# Patient Record
Sex: MALE | Race: WHITE | Employment: OTHER | URBAN - METROPOLITAN AREA
[De-identification: names, ages, dates, MRNs, and addresses within clinical notes are randomized per-mention and may not be internally consistent; named-entity substitution may affect disease eponyms.]

---

## 2019-06-05 ENCOUNTER — HOSPITAL ENCOUNTER (OUTPATIENT)
Dept: GENERAL RADIOLOGY | Age: 84
Discharge: HOME | End: 2019-06-05
Attending: NURSE PRACTITIONER
Payer: MEDICARE

## 2019-06-05 DIAGNOSIS — M25.511 RIGHT SHOULDER PAIN: ICD-10-CM

## 2019-06-05 PROCEDURE — 73030 X-RAY EXAM OF SHOULDER: CPT

## 2019-07-16 ENCOUNTER — HOSPITAL ENCOUNTER (OUTPATIENT)
Dept: GENERAL RADIOLOGY | Age: 84
Discharge: HOME | End: 2019-07-16
Attending: INTERNAL MEDICINE
Payer: MEDICARE

## 2019-07-16 DIAGNOSIS — M54.50 LUMBAGO: ICD-10-CM

## 2019-07-16 PROCEDURE — 72100 X-RAY EXAM L-S SPINE 2/3 VWS: CPT

## 2019-08-05 ENCOUNTER — OFFICE VISIT (OUTPATIENT)
Dept: NEUROLOGY | Age: 84
End: 2019-08-05

## 2019-08-05 VITALS
DIASTOLIC BLOOD PRESSURE: 80 MMHG | OXYGEN SATURATION: 95 % | BODY MASS INDEX: 34.05 KG/M2 | WEIGHT: 251.4 LBS | RESPIRATION RATE: 18 BRPM | HEIGHT: 72 IN | SYSTOLIC BLOOD PRESSURE: 138 MMHG | HEART RATE: 82 BPM

## 2019-08-05 DIAGNOSIS — G62.9 NEUROPATHY: ICD-10-CM

## 2019-08-05 DIAGNOSIS — G62.9 NEUROPATHY: Primary | ICD-10-CM

## 2019-08-05 DIAGNOSIS — R27.8 SENSORY ATAXIA: ICD-10-CM

## 2019-08-05 RX ORDER — GUAIFENESIN 100 MG/5ML
81 LIQUID (ML) ORAL DAILY
COMMUNITY

## 2019-08-05 RX ORDER — ZINC GLUCONATE 10 MG
LOZENGE ORAL
COMMUNITY

## 2019-08-05 NOTE — PATIENT INSTRUCTIONS
Information Regarding Testing If you have physican order for a test or a medication denied by your insurance company, this does not mean the test or medication is not appropriate for you as that is a medical decision, not a decision to be made by an insurance company representative or by an [facility] physician who has not interviewed and examined you. This is a decision to be made between you and your physician. The denial of services is a contractual matter between you and your insurance company, not an issue between your physician and the insurance company. If your test or medication is denied, you can take the following steps to help resolve the issue: 1. File a complaint with the [facility] regarding your insurance company's denial of services ordered for you. You can do this either by calling them directly or by completing an on-line complaint form on the [employer]. This can be found at www.[place].Rerecipe 2. Also file a formal complaint with your insurance company and ask to have the name of the person denying the service so that you may explore a legal option should you be harmed by this denial of service. Again, the fact the insurance company will not pay for the service does not mean it is not medically necessary and I would encourage you to follow through with the plan that was made with your physician 3. File a written complaint with your employer so your employer and benefit manager is aware of the poor coverage they are providing their employees. If you have medicare/medicaid, complain to your representative in the House and to your Caitie Brooks. RESULT POLICY If we have ordered testing for you, know that; \"NO NEWS IS GOOD NEWS! \" It is our policy that we no longer call patients with results, nor do we  give test results over the phone.    
 
We schedule follow up appointments so that your results can be discussed in person. This allows you to address any questions you have regarding the results. If you choose to go to an imaging center outside of [facility], it is your responsibility to bring imaging report and disc to follow up appointment. If something of concern is revealed on your test, we will contact you to discuss the matter and if needed schedule a sooner follow up appointment. Additionally, results may be found by using the My Chart feature and one of our patient service representatives at the  can give you instructions on how to access this feature to utilize our electronic medical record system. Thank you for your understanding. Learning About Peripheral Neuropathy What is peripheral neuropathy? Peripheral neuropathy is a problem that affects the peripheral nerves. These nerves lead from the spinal cord to other parts of the body. They control your sense of touch, how you feel pain and temperature, and your muscle strength. Most of the time the problem starts in the fingers and toes. As it gets worse, it moves into the limbs. It can cause pain and loss of feeling in the feet, legs, and hands. What causes it? There are several causes: · Diabetes. This is the most common cause. If your blood sugar is too high for too long, it can damage the nerves. · Kidney problems. These can lead to toxic substances in the blood that damage nerves. · Low levels of thyroid hormone (hypothyroidism). This may cause swelling of the tissues around the nerves, which can put pressure on them. · Infectious or inflammatory diseases. Examples are HIV and Guillain-Barré syndrome. These diseases can damage the nerves. · Peripheral nerve injury. A physical injury can damage the nerves. Injuries can be from things like falls, car crashes, or playing sports. · Overusing alcohol and not eating a healthy diet.  These can lead to your body not having enough of certain vitamins, such as vitamin B-12. This can damage nerves. · Being exposed to toxic substances. These include lead, mercury, and certain medicines, such as those used for chemotherapy. Sometimes the cause isn't known. What are the symptoms? Symptoms of peripheral neuropathy can occur slowly over time. The most common ones are: · Numbness, tightness, and tingling, especially in the legs, hands, and feet. · Loss of feeling. · Burning, shooting, or stabbing pain in the legs, hands, and feet. Often the pain is worse at night. · Weakness and loss of balance. What can happen if you have it? If peripheral neuropathy gets worse, it can lead to a complete lack of feeling in your hands or feet. This can make you more likely to injure them. It may lead to calluses and blisters. It can also lead to bone and joint problems, infection, and ulcers. For instance, small, repeated injuries to the foot may lead to bigger problems. This can happen because you can't feel the injuries. Reduced feeling in the feet can also change your step, leading to bone or joint problems. If untreated, foot problems can become so severe that the foot or lower leg may have to be amputated. But treatment can slow down peripheral neuropathy. And it's a good idea to take care to avoid injury. How is it diagnosed? To diagnose peripheral neuropathy, your doctor will ask you about: 
· Your symptoms. · Your medical history. This may include your use of alcohol, risk of HIV infection, or exposure to toxic substances. · Your family's medical history, including nerve disease. Your doctor may test how well you can feel touch, temperature, and pain. You may also have blood tests. These tests will help the doctor find out if you have conditions that can cause neuropathy. Examples are diabetes, vitamin deficiencies, thyroid disease, and kidney problems. How is it treated? Treatment for peripheral neuropathy can relieve symptoms. This is done by treating the health problem that's causing it. For example, if you have diabetes, keeping your blood sugar within your target range may help. Or maybe your body lacks certain vitamins caused by drinking too much alcohol. In that case, treatment may include eating a healthy diet, taking vitamins, and stopping alcohol use. You may have physical therapy. This can increase muscle strength and help build muscle control. Over-the-counter medicine can relieve mild nerve pain. Your doctor may also prescribe medicine to help with severe pain, numbness, tingling, and weakness. If you have neuropathy in your feet, it's a good idea to have them checked during each office visit. This can help prevent problems. Some people find that physical therapy, acupuncture, or transcutaneous electrical nerve stimulation (TENS) helps relieve pain. Follow-up care is a key part of your treatment and safety. Be sure to make and go to all appointments, and call your doctor if you are having problems. It's also a good idea to know your test results and keep a list of the medicines you take. Where can you learn more? Go to http://amadou-rito.info/. Enter P130 in the search box to learn more about \"Learning About Peripheral Neuropathy. \" Current as of: July 25, 2018 Content Version: 12.1 © 1351-7928 Healthwise, Incorporated. Care instructions adapted under license by [employer] (which disclaims liability or warranty for this information). If you have questions about a medical condition or this instruction, always ask your healthcare professional. [place] [address] any warranty or liability for your use of this information. Preventing Falls: Care Instructions Your Care Instructions Getting around your home safely can be a challenge if you have injuries or health problems that make it easy for you to fall. Loose rugs and furniture in walkways are among the dangers for many older people who have problems walking or who have poor eyesight. People who have conditions such as arthritis, osteoporosis, or dementia also have to be careful not to fall. You can make your home safer with a few simple measures. Follow-up care is a key part of your treatment and safety. Be sure to make and go to all appointments, and call your doctor if you are having problems. It's also a good idea to know your test results and keep a list of the medicines you take. How can you care for yourself at home? Taking care of yourself · You may get dizzy if you do not drink enough water. To prevent dehydration, drink plenty of fluids, enough so that your urine is light yellow or clear like water. Choose water and other caffeine-free clear liquids. If you have kidney, heart, or liver disease and have to limit fluids, talk with your doctor before you increase the amount of fluids you drink. · Exercise regularly to improve your strength, muscle tone, and balance. Walk if you can. Swimming may be a good choice if you cannot walk easily. · Have your vision and hearing checked each year or any time you notice a change. If you have trouble seeing and hearing, you might not be able to avoid objects and could lose your balance. · Know the side effects of the medicines you take. Ask your doctor or pharmacist whether the medicines you take can affect your balance. Sleeping pills or sedatives can affect your balance. · Limit the amount of alcohol you drink. Alcohol can impair your balance and other senses. · Ask your doctor whether calluses or corns on your feet need to be removed. If you wear loose-fitting shoes because of calluses or corns, you can lose your balance and fall. · Talk to your doctor if you have numbness in your feet. Preventing falls at home · Remove raised doorway thresholds, throw rugs, and clutter. Repair loose carpet or raised areas in the floor. · Move furniture and electrical cords to keep them out of walking paths. · Use nonskid floor wax, and wipe up spills right away, especially on ceramic tile floors. · If you use a walker or cane, put rubber tips on it. If you use crutches, clean the bottoms of them regularly with an abrasive pad, such as steel wool. · Keep your house well lit, especially Jeneal Baas, and outside walkways. Use night-lights in areas such as hallways and bathrooms. Add extra light switches or use remote switches (such as switches that go on or off when you clap your hands) to make it easier to turn lights on if you have to get up during the night. · Install sturdy handrails on stairways. · Move items in your cabinets so that the things you use a lot are on the lower shelves (about waist level). · Keep a cordless phone and a flashlight with new batteries by your bed. If possible, put a phone in each of the main rooms of your house, or carry a cell phone in case you fall and cannot reach a phone. Or, you can wear a device around your neck or wrist. You push a button that sends a signal for help. · Wear low-heeled shoes that fit well and give your feet good support. Use footwear with nonskid soles. Check the heels and soles of your shoes for wear. Repair or replace worn heels or soles. · Do not wear socks without shoes on wood floors. · Walk on the grass when the sidewalks are slippery. If you live in an area that gets snow and ice in the winter, sprinkle salt on slippery steps and sidewalks. Preventing falls in the bath · Install grab bars and nonskid mats inside and outside your shower or tub and near the toilet and sinks. · Use shower chairs and bath benches. · Use a hand-held shower head that will allow you to sit while showering.  
· Get into a tub or shower by putting the weaker leg in first. Get out of a tub or shower with your strong side first. 
· Repair loose toilet seats and consider installing a raised toilet seat to make getting on and off the toilet easier. · Keep your bathroom door unlocked while you are in the shower. Where can you learn more? Go to http://amadou-rito.info/. Enter 0476 [id number] in the search box to learn more about \"Preventing Falls: Care Instructions. \" Current as of: November 7, 2018 Content Version: 12.1 © 1015-6619 [employer]. Care instructions adapted under license by [employer] (which disclaims liability or warranty for this information). If you have questions about a medical condition or this instruction, always ask your healthcare professional. [address] any warranty or liability for your use of this information. Marizol Coronado 1729 What is a living will? A living will is a legal form you use to write down the kind of care you want at the end of your life. It is used by the health professionals who will treat you if you aren't able to decide for yourself. If you put your wishes in writing, your loved ones and others will know what kind of care you want. They won't need to guess. This can ease your mind and be helpful to others. A living will is not the same as an estate or property will. An estate will explains what you want to happen with your money and property after you die. Is a living will a legal document? A living will is a legal document. Each state has its own laws about living wong. If you move to another state, make sure that your living will is legal in the state where you now live. Or you might use a universal form that has been approved by many states. This kind of form can sometimes be completed and stored online. Your electronic copy will then be available wherever you have a connection to the Internet.  In most cases, doctors will respect your wishes even if you have a form from a different state. · You don't need an  to complete a living will. But legal advice can be helpful if your state's laws are unclear, your health history is complicated, or your family can't agree on what should be in your living will. · You can change your living will at any time. Some people find that their wishes about end-of-life care change as their health changes. · In addition to making a living will, think about completing a medical power of  form. This form lets you name the person you want to make end-of-life treatment decisions for you (your \"health care agent\") if you're not able to. Many hospitals and nursing homes will give you the forms you need to complete a living will and a medical power of . · Your living will is used only if you can't make or communicate decisions for yourself anymore. If you become able to make decisions again, you can accept or refuse any treatment, no matter what you wrote in your living will. · Your state may offer an online registry. This is a place where you can store your living will online so the doctors and nurses who need to treat you can find it right away. What should you think about when creating a living will? Talk about your end-of-life wishes with your family members and your doctor. Let them know what you want. That way the people making decisions for you won't be surprised by your choices. Think about these questions as you make your living will: · Do you know enough about life support methods that might be used? If not, talk to your doctor so you know what might be done if you can't breathe on your own, your heart stops, or you're unable to swallow. · What things would you still want to be able to do after you receive life-support methods? Would you want to be able to walk? To speak? To eat on your own? To live without the help of machines? · If you have a choice, where do you want to be cared for? In your home? At a hospital or nursing home? · Do you want certain Religion practices performed if you become very ill? · If you have a choice at the end of your life, where would you prefer to die? At home? In a hospital or nursing home? Somewhere else? · Would you prefer to be buried or cremated? · Do you want your organs to be donated after you die? What should you do with your living will? · Make sure that your family members and your health care agent have copies of your living will. · Give your doctor a copy of your living will to keep in your medical record. If you have more than one doctor, make sure that each one has a copy. · You may want to put a copy of your living will where it can be easily found. Where can you learn more? Go to http://amadou-rito.info/. Enter V533 in the search box to learn more about \"Learning About Living [place]. \" Current as of: April 1, 2019 Content Version: 12.1 © 2123-6820 Healthwise, Incorporated. Care instructions adapted under license by [employer] (which disclaims liability or warranty for this information). If you have questions about a medical condition or this instruction, always ask your healthcare professional. [address] any warranty or liability for your use of this information. Patient with readily manifest peripheral neuropathy and there is no doubt explains his imbalance issues and his fall history. We will check some labs and do an EMG nerve conduction of both lower extremity's. Further suggestions may follow.

## 2019-08-05 NOTE — LETTER
8/5/19 Patient: Abbie Rivas YOB: 1928 Date of Visit: 8/5/2019 Tata Wynne MD 
[address] [facility] [address] VIA Facsimile: [phone] Dear Tata Wynne MD, Thank you for referring Mr. Abbie Rivas to [facility] for evaluation. My notes for this consultation are attached. If you have questions, please do not hesitate to call me. I look forward to following your patient along with you.  
 
 
Sincerely, 
 
Radha Zuniga MD

## 2019-08-05 NOTE — PROGRESS NOTES
Peripheral neuropathy and sensory ataxia. Neurology Consult      Subjective:      Flower Michel is a 80 y.o. male Who comes in today with the following history. Magaly Kirsten he has had painful feet for years and on a continuous basis. Was out in [place] and there was some kind of connection with a virus but nothing found? No background history of diabetes. Family positive for daughter with diabetes and no further details. Patient describes his extremities as demonstrating tingling and difficulty with proprioception with gas and break in his car. He falls may be once every 6 months. Bowel and bladder function okay but he additionally complains of poor balance on his feet and fatigue. He is a never smoker. No peripheral claudication history. Current Outpatient Medications   Medication Sig Dispense Refill    ALLOPURINOL PO Take 100 mg by mouth.  aspirin 81 mg chewable tablet Take 81 mg by mouth daily.  leuprolide acetate (LUPRON DEPOT IM) by IntraMUSCular route. Every 6 mo      PREDNISONE PO Take  by mouth.  calcium-cholecalciferol, d3, (CALCIUM 600 + D) 600-125 mg-unit tab Take  by mouth.  vit C/E/Zn/coppr/lutein/zeaxan (PRESERVISION AREDS-2 PO) Take  by mouth.  magnesium 250 mg tab Take  by mouth.         Allergies   Allergen Reactions    Oxycodone Not Reported This Time    Pcn [Penicillins] Not Reported This Time     Past Medical History:   Diagnosis Date    CAD (coronary artery disease)     Chronic kidney disease     Falls frequently     Incontinence     Musculoskeletal disorder     Neuropathy     Tinnitus       Past Surgical History:   Procedure Laterality Date    HX AORTIC VALVE REPLACEMENT  2017    HX KNEE REPLACEMENT Bilateral 2012    HX NEPHRECTOMY Left 2012      Social History     Socioeconomic History    Marital status:      Spouse name: Not on file    Number of children: Not on file    Years of education: Not on file    Highest education level: Not on file   Occupational History    Not on file   Social Needs    Financial resource strain: Not on file    Food insecurity:     Worry: Not on file     Inability: Not on file    Transportation needs:     Medical: Not on file     Non-medical: Not on file   Tobacco Use    Smoking status: Never Smoker    Smokeless tobacco: Never Used   Substance and Sexual Activity    Alcohol use: Yes     Alcohol/week: 10.0 standard drinks     Types: 5 Glasses of wine, 5 Shots of liquor per week    Drug use: Not on file    Sexual activity: Not on file   Lifestyle    Physical activity:     Days per week: Not on file     Minutes per session: Not on file    Stress: Not on file   Relationships    Social connections:     Talks on phone: Not on file     Gets together: Not on file     Attends [employer] service: Not on file     Active member of club or organization: Not on file     Attends meetings of clubs or organizations: Not on file     Relationship status: Not on file    Intimate partner violence:     Fear of current or ex partner: Not on file     Emotionally abused: Not on file     Physically abused: Not on file     Forced sexual activity: Not on file   Other Topics Concern    Not on file   Social History Narrative    Not on file      Family History   Problem Relation Age of Onset    Cancer Mother     Stroke Father       Visit Vitals  /80   Pulse 82   Resp 18   Ht 6' (1.829 m)   Wt 114 kg (251 lb 6.4 oz)   SpO2 95%   BMI 34.10 kg/m²        Review of Systems:   A comprehensive review of systems was negative except for that written in the HPI. Neuro Exam:     Appearance: The patient is well developed, well nourished, provides a coherent history and is in no acute distress. Mental Status: Oriented to time, place and person. Mood and affect appropriate. Cranial Nerves:   Intact visual fields. Fundi are benign. ZOILA, EOM's full, no nystagmus, no ptosis. Facial sensation is normal. Corneal reflexes are intact. Facial movement is symmetric. Hearing is normal bilaterally. Palate is midline with normal sternocleidomastoid and trapezius muscles are normal. Tongue is midline. Motor:  5/5 strength in upper and lower proximal and distal muscles. Normal bulk and tone. No fasciculations. Reflexes:   Deep tendon reflexes 0-trace/4 and symmetrical.   Sensory:    Diminished distally beyond moderate to touch, pinprick and vibration. Position sense out both feet. Gait:   Transfers and gait taken cautiously and patient tends to keep his eyes on the floor in front of him step to step. Positive Romberg. Tremor:   No tremor noted. Cerebellar:  No cerebellar signs present. Neurovascular:  Normal heart sounds and regular rhythm, peripheral pulses intact, and no carotid bruits. Toes downgoing no clonus no Garcia's. Straight leg raising -90 degrees. Brawny pigmentation of both distal lower extremities. Assessment:   Peripheral neuropathy and sensory ataxia. Will work-up with labs and an EMG nerve conduction of both lower extremities. Further suggestions could follow. Plan:   Revisit 2 months.   Signed by :  Sahil Bryant MD

## 2019-08-06 DIAGNOSIS — R27.8 SENSORY ATAXIA: ICD-10-CM

## 2019-08-06 DIAGNOSIS — G62.9 NEUROPATHY: ICD-10-CM

## 2019-08-07 LAB
GLUCOSE 1.5H P 75 G GLC PO SERPL-MCNC: NORMAL MG/DL
GLUCOSE 1H P 75 G GLC PO SERPL-MCNC: 114 MG/DL (ref 65–199)
GLUCOSE 2H P 75 G GLC PO SERPL-MCNC: 103 MG/DL (ref 65–139)
GLUCOSE 30M P 75 G GLC PO SERPL-MCNC: NORMAL MG/DL
GLUCOSE 3H P 75 G GLC PO SERPL-MCNC: 73 MG/DL (ref 65–109)
GLUCOSE 4H P 75 G GLC PO SERPL-MCNC: NORMAL MG/DL
GLUCOSE 5H P 75 G GLC PO SERPL-MCNC: NORMAL MG/DL
GLUCOSE 6H P 75 G GLC PO SERPL-MCNC: NORMAL MG/DL
GLUCOSE P FAST SERPL-MCNC: 77 MG/DL (ref 65–99)

## 2019-08-08 LAB
ALBUMIN SERPL ELPH-MCNC: 3.3 G/DL (ref 2.9–4.4)
ALBUMIN SERPL-MCNC: 3.8 G/DL (ref 3.2–4.6)
ALBUMIN/GLOB SERPL: 0.9 {RATIO} (ref 0.7–1.7)
ALBUMIN/GLOB SERPL: 1.2 {RATIO} (ref 1.2–2.2)
ALP SERPL-CCNC: 146 IU/L (ref 39–117)
ALPHA1 GLOB SERPL ELPH-MCNC: 0.2 G/DL (ref 0–0.4)
ALPHA2 GLOB SERPL ELPH-MCNC: 0.7 G/DL (ref 0.4–1)
ALT SERPL-CCNC: 29 IU/L (ref 0–44)
ANA SER QL: NEGATIVE
AST SERPL-CCNC: 21 IU/L (ref 0–40)
B-GLOBULIN SERPL ELPH-MCNC: 0.9 G/DL (ref 0.7–1.3)
BASOPHILS # BLD AUTO: 0 X10E3/UL (ref 0–0.2)
BASOPHILS NFR BLD AUTO: 0 %
BILIRUB SERPL-MCNC: 1.1 MG/DL (ref 0–1.2)
BUN SERPL-MCNC: 37 MG/DL (ref 10–36)
BUN/CREAT SERPL: 32 (ref 10–24)
C-ANCA TITR SER IF: NORMAL TITER
CALCIUM SERPL-MCNC: 9.9 MG/DL (ref 8.6–10.2)
CHLORIDE SERPL-SCNC: 101 MMOL/L (ref 96–106)
CO2 SERPL-SCNC: 26 MMOL/L (ref 20–29)
CREAT SERPL-MCNC: 1.15 MG/DL (ref 0.76–1.27)
CRP SERPL-MCNC: 6 MG/L (ref 0–10)
ENA SS-A AB SER-ACNC: <0.2 AI (ref 0–0.9)
ENA SS-B AB SER-ACNC: 0.2 AI (ref 0–0.9)
EOSINOPHIL # BLD AUTO: 0.2 X10E3/UL (ref 0–0.4)
EOSINOPHIL NFR BLD AUTO: 2 %
ERYTHROCYTE [DISTWIDTH] IN BLOOD BY AUTOMATED COUNT: 13 % (ref 12.3–15.4)
ERYTHROCYTE [SEDIMENTATION RATE] IN BLOOD BY WESTERGREN METHOD: 48 MM/HR (ref 0–30)
FOLATE SERPL-MCNC: >20 NG/ML
GAMMA GLOB SERPL ELPH-MCNC: 2 G/DL (ref 0.4–1.8)
GLOBULIN SER CALC-MCNC: 3.3 G/DL (ref 1.5–4.5)
GLOBULIN SER CALC-MCNC: 3.8 G/DL (ref 2.2–3.9)
GLUCOSE SERPL-MCNC: 87 MG/DL (ref 65–99)
HCT VFR BLD AUTO: 38.9 % (ref 37.5–51)
HGB BLD-MCNC: 12.8 G/DL (ref 13–17.7)
IMM GRANULOCYTES # BLD AUTO: 0.2 X10E3/UL (ref 0–0.1)
IMM GRANULOCYTES NFR BLD AUTO: 2 %
LYMPHOCYTES # BLD AUTO: 3.1 X10E3/UL (ref 0.7–3.1)
LYMPHOCYTES NFR BLD AUTO: 29 %
Lab: NORMAL
M PROTEIN SERPL ELPH-MCNC: 1.3 G/DL
MCH RBC QN AUTO: 30.2 PG (ref 26.6–33)
MCHC RBC AUTO-ENTMCNC: 32.9 G/DL (ref 31.5–35.7)
MCV RBC AUTO: 92 FL (ref 79–97)
METHYLMALONATE SERPL-SCNC: 288 NMOL/L (ref 0–378)
MONOCYTES # BLD AUTO: 0.8 X10E3/UL (ref 0.1–0.9)
MONOCYTES NFR BLD AUTO: 8 %
MYELOPEROXIDASE AB SER IA-ACNC: <9 U/ML (ref 0–9)
NEUTROPHILS # BLD AUTO: 6.3 X10E3/UL (ref 1.4–7)
NEUTROPHILS NFR BLD AUTO: 59 %
P-ANCA ATYPICAL TITR SER IF: NORMAL TITER
P-ANCA TITR SER IF: NORMAL TITER
PLATELET # BLD AUTO: 189 X10E3/UL (ref 150–450)
PLEASE NOTE, 011150: ABNORMAL
POTASSIUM SERPL-SCNC: 5.3 MMOL/L (ref 3.5–5.2)
PROT SERPL-MCNC: 7.1 G/DL (ref 6–8.5)
PROTEINASE3 AB SER IA-ACNC: <3.5 U/ML (ref 0–3.5)
RBC # BLD AUTO: 4.24 X10E6/UL (ref 4.14–5.8)
RHEUMATOID FACT SERPL-ACNC: <10 IU/ML (ref 0–13.9)
SODIUM SERPL-SCNC: 140 MMOL/L (ref 134–144)
TSH SERPL DL<=0.005 MIU/L-ACNC: 1.21 UIU/ML (ref 0.45–4.5)
VIT B12 SERPL-MCNC: 1008 PG/ML (ref 232–1245)
WBC # BLD AUTO: 10.7 X10E3/UL (ref 3.4–10.8)

## 2019-08-13 ENCOUNTER — TELEPHONE (OUTPATIENT)
Dept: NEUROLOGY | Age: 84
End: 2019-08-13

## 2019-09-25 ENCOUNTER — OFFICE VISIT (OUTPATIENT)
Dept: NEUROLOGY | Age: 84
End: 2019-09-25

## 2019-09-25 DIAGNOSIS — Z91.199 NO-SHOW FOR APPOINTMENT: Primary | ICD-10-CM

## 2019-10-14 ENCOUNTER — TELEPHONE (OUTPATIENT)
Dept: NEUROLOGY | Age: 84
End: 2019-10-14

## 2019-10-14 NOTE — TELEPHONE ENCOUNTER
Called and LM on Vm to call office if taking the stairs is an issue so that we can re-schedule the appt. For another time as the elevator is still out of order.